# Patient Record
Sex: MALE | Race: BLACK OR AFRICAN AMERICAN | Employment: FULL TIME | URBAN - METROPOLITAN AREA
[De-identification: names, ages, dates, MRNs, and addresses within clinical notes are randomized per-mention and may not be internally consistent; named-entity substitution may affect disease eponyms.]

---

## 2020-09-24 ENCOUNTER — HOSPITAL ENCOUNTER (EMERGENCY)
Facility: CLINIC | Age: 25
Discharge: HOME | End: 2020-09-24
Attending: EMERGENCY MEDICINE

## 2020-09-24 VITALS
BODY MASS INDEX: 28.44 KG/M2 | DIASTOLIC BLOOD PRESSURE: 69 MMHG | OXYGEN SATURATION: 97 % | WEIGHT: 210 LBS | SYSTOLIC BLOOD PRESSURE: 148 MMHG | HEART RATE: 75 BPM | TEMPERATURE: 98.6 F | RESPIRATION RATE: 20 BRPM | HEIGHT: 72 IN

## 2020-09-24 PROCEDURE — 99284 EMERGENCY DEPT VISIT MOD MDM: CPT

## 2020-09-24 RX ORDER — CYCLOBENZAPRINE HCL 10 MG
10 TABLET ORAL 3 TIMES DAILY PRN
Qty: 21 TABLET | Refills: 0 | Status: SHIPPED | OUTPATIENT
Start: 2020-09-24 | End: 2020-10-04

## 2020-09-24 RX ORDER — IBUPROFEN 600 MG/1
600 TABLET ORAL EVERY 6 HOURS PRN
Qty: 120 TABLET | Refills: 0 | Status: SHIPPED | OUTPATIENT
Start: 2020-09-24

## 2020-09-24 ASSESSMENT — PAIN DESCRIPTION - ORIENTATION: ORIENTATION: MID;UPPER

## 2020-09-24 ASSESSMENT — PAIN DESCRIPTION - LOCATION: LOCATION: BACK

## 2020-09-24 ASSESSMENT — PAIN DESCRIPTION - PAIN TYPE: TYPE: ACUTE PAIN

## 2020-09-24 ASSESSMENT — PAIN SCALES - GENERAL: PAINLEVEL_OUTOF10: 8

## 2020-09-24 NOTE — ED PROVIDER NOTES
[address] ENCOUNTER      Pt Name: Radhika Payan  MRN: [id number]  [place] 1995  Date of evaluation: 9/24/2020      CHIEF COMPLAINT       Chief Complaint   Patient presents with    Back Pain         HISTORY OF PRESENT ILLNESS      The patient presents with back pain below his shoulder blades and in his low back. He says it got worse over the past 2 days, but he has had some problems since February. He says he has been seeing the nurse at his workplace. They changed his duties and he has to lift heavier objects. They changed his duties a couple days ago, and that is when his pain started getting worse. He denies weakness or numbness. He denies bowel or bladder problems. Pain is worse with movement and better with rest.  He has not had a fever. He denies back surgery or injections. REVIEW OF SYSTEMS       All systems reviewed and negative unless noted in HPI. The patient denies fever or constitutional symptoms. Denies vision change. Denies any sore throat or rhinorrhea. Denies any neck pain or stiffness. Denies chest pain or shortness of breath. No nausea,  vomiting or diarrhea. Denies any dysuria. Denies urinary frequency or hematuria. Back pain as noted in HPI. Denies any weakness, numbness or focal neurologic deficit. Denies any skin rash or edema. No recent psychiatric issues. No easy bruising or bleeding. Denies any polyuria, polydypsia or history of immunocompromise. PAST MEDICAL HISTORY    has no past medical history on file. SURGICAL HISTORY      has no past surgical history on file. CURRENT MEDICATIONS       Previous Medications    No medications on file       ALLERGIES     has No Known Allergies. FAMILY HISTORY     has no family status information on file. family history is not on file. SOCIAL HISTORY      reports that he has quit smoking.  He does not have any smokeless tobacco history on file. He reports current alcohol use. He reports that he does not use drugs. PHYSICAL EXAM     INITIAL VITALS:  height is 6' (1.829 m) and weight is 95.3 kg (210 lb). His oral temperature is 98.6 °F (37 °C). His blood pressure is 148/69 (abnormal) and his pulse is 75. His respiration is 20 and oxygen saturation is 97%. Patient is alert and oriented, in no apparent distress. HEENT is atraumatic. Pupils are PERRL at 5 mm. Mucous membranes moist.    Neck is supple with no pain or step-off. Heart sounds regular rate and rhythm with no gallops, murmurs, or rubs. Lungs clear, no wheezes, rales or rhonchi. Abdomen: soft, nontender with no pain to palpation. Musculoskeletal exam: No pain to palpation in the midline of the thoracic or lumbar spine. Mild discomfort noted in the paraspinous musculature just below the scapula bilaterally, as well as in the lumbar area bilaterally. No skin changes noted. No deformity noted. +5-5 gross motor strength in all extremities. Normal distal pulses in all extremities. Skin: no rash or edema. Neurological exam reveals cranial nerves 2 through 12 grossly intact. Patient has equal  and normal deep tendon reflexes. No saddle paresthesias. Psychiatric: no hallucinations or suicidal ideation. Lymphatics.:  No lymphadenopathy. DIFFERENTIAL DIAGNOSIS/ MDM:     Strain, fracture, cauda equina syndrome    DIAGNOSTIC RESULTS         EMERGENCY DEPARTMENT COURSE:   Vitals:    Vitals:    09/24/20 1549   BP: (!) 148/69   Pulse: 75   Resp: 20   Temp: 98.6 °F (37 °C)   TempSrc: Oral   SpO2: 97%   Weight: 95.3 kg (210 lb)   Height: 6' (1.829 m)     -------------------------  BP: (!) 148/69, Temp: 98.6 °F (37 °C), Pulse: 75, Resp: 20      Re-evaluation Notes    I do not think that imaging is clinically indicated at this time. I will have the patient follow-up with his Worker's Compensation provider.   In the meantime, I will write for NSAIDs and muscle relaxers. The patient is discharged in good condition. I have written limitations for his duties. Controlled Substance Monitoring:    Acute and Chronic Pain Monitoring:   No flowsheet data found. FINAL IMPRESSION      1.  Thoracolumbar back pain          DISPOSITION/PLAN   DISPOSITION Decision To Discharge 09/24/2020 04:07:13 PM      Condition on Disposition    good    PATIENT REFERRED TO:  your Worker's Compensation provider    In 2 days        DISCHARGE MEDICATIONS:  New Prescriptions    CYCLOBENZAPRINE (FLEXERIL) 10 MG TABLET    Take 1 tablet by mouth 3 times daily as needed for Muscle spasms    IBUPROFEN (IBU) 600 MG TABLET    Take 1 tablet by mouth every 6 hours as needed for Pain       (Please note that portions of this note were completed with a voice recognition program.  Efforts were made to edit the dictations but occasionally words are mis-transcribed.)    Rojo MD   Attending Emergency Physician       Pankaj Orozco MD  09/24/20 1059